# Patient Record
Sex: MALE | Race: WHITE | URBAN - METROPOLITAN AREA
[De-identification: names, ages, dates, MRNs, and addresses within clinical notes are randomized per-mention and may not be internally consistent; named-entity substitution may affect disease eponyms.]

---

## 2020-11-06 ENCOUNTER — APPOINTMENT (RX ONLY)
Dept: URBAN - METROPOLITAN AREA CLINIC 158 | Facility: CLINIC | Age: 80
Setting detail: DERMATOLOGY
End: 2020-11-06

## 2020-11-06 DIAGNOSIS — D69.2 OTHER NONTHROMBOCYTOPENIC PURPURA: ICD-10-CM

## 2020-11-06 DIAGNOSIS — Z85.828 PERSONAL HISTORY OF OTHER MALIGNANT NEOPLASM OF SKIN: ICD-10-CM

## 2020-11-06 DIAGNOSIS — L57.0 ACTINIC KERATOSIS: ICD-10-CM

## 2020-11-06 DIAGNOSIS — Z71.89 OTHER SPECIFIED COUNSELING: ICD-10-CM

## 2020-11-06 DIAGNOSIS — L72.0 EPIDERMAL CYST: ICD-10-CM

## 2020-11-06 DIAGNOSIS — L82.1 OTHER SEBORRHEIC KERATOSIS: ICD-10-CM

## 2020-11-06 DIAGNOSIS — L81.4 OTHER MELANIN HYPERPIGMENTATION: ICD-10-CM

## 2020-11-06 DIAGNOSIS — Z86.006 PERSONAL HISTORY OF MELANOMA IN-SITU: ICD-10-CM

## 2020-11-06 PROBLEM — Z85.820 PERSONAL HISTORY OF MALIGNANT MELANOMA OF SKIN: Status: ACTIVE | Noted: 2020-11-06

## 2020-11-06 PROCEDURE — ? OBSERVATION

## 2020-11-06 PROCEDURE — 99203 OFFICE O/P NEW LOW 30 MIN: CPT | Mod: 25

## 2020-11-06 PROCEDURE — 17004 DESTROY PREMAL LESIONS 15/>: CPT

## 2020-11-06 PROCEDURE — ? COUNSELING

## 2020-11-06 PROCEDURE — ? OTHER

## 2020-11-06 PROCEDURE — ? LIQUID NITROGEN

## 2020-11-06 ASSESSMENT — LOCATION SIMPLE DESCRIPTION DERM
LOCATION SIMPLE: RIGHT PRETIBIAL REGION
LOCATION SIMPLE: LEFT EAR
LOCATION SIMPLE: CHEST
LOCATION SIMPLE: NECK
LOCATION SIMPLE: RIGHT TEMPLE
LOCATION SIMPLE: UPPER BACK
LOCATION SIMPLE: LEFT UPPER ARM
LOCATION SIMPLE: POSTERIOR SCALP
LOCATION SIMPLE: LEFT CHEEK
LOCATION SIMPLE: SCALP
LOCATION SIMPLE: LEFT PRETIBIAL REGION
LOCATION SIMPLE: LEFT SHOULDER
LOCATION SIMPLE: RIGHT EYEBROW
LOCATION SIMPLE: NOSE
LOCATION SIMPLE: RIGHT SCALP
LOCATION SIMPLE: RIGHT FOREARM
LOCATION SIMPLE: RIGHT FOREHEAD
LOCATION SIMPLE: ABDOMEN
LOCATION SIMPLE: LEFT FOREARM
LOCATION SIMPLE: LEFT ANTERIOR NECK
LOCATION SIMPLE: RIGHT EAR
LOCATION SIMPLE: LOWER BACK
LOCATION SIMPLE: RIGHT SHOULDER

## 2020-11-06 ASSESSMENT — LOCATION ZONE DERM
LOCATION ZONE: LEG
LOCATION ZONE: ARM
LOCATION ZONE: FACE
LOCATION ZONE: NECK
LOCATION ZONE: NOSE
LOCATION ZONE: EAR
LOCATION ZONE: SCALP
LOCATION ZONE: TRUNK

## 2020-11-06 ASSESSMENT — LOCATION DETAILED DESCRIPTION DERM
LOCATION DETAILED: RIGHT SUPERIOR FOREHEAD
LOCATION DETAILED: SUPERIOR LUMBAR SPINE
LOCATION DETAILED: LEFT CLAVICULAR NECK
LOCATION DETAILED: STERNUM
LOCATION DETAILED: LEFT ANTERIOR SHOULDER
LOCATION DETAILED: RIGHT LATERAL EYEBROW
LOCATION DETAILED: LEFT VENTRAL PROXIMAL FOREARM
LOCATION DETAILED: SUPERIOR THORACIC SPINE
LOCATION DETAILED: LEFT CENTRAL POSTERIOR NECK
LOCATION DETAILED: LEFT SUPERIOR CENTRAL BUCCAL CHEEK
LOCATION DETAILED: LEFT POSTERIOR PARIETAL SCALP
LOCATION DETAILED: RIGHT POSTERIOR SHOULDER
LOCATION DETAILED: RIGHT DISTAL DORSAL FOREARM
LOCATION DETAILED: LEFT DISTAL PRETIBIAL REGION
LOCATION DETAILED: NASAL DORSUM
LOCATION DETAILED: RIGHT SUPERIOR HELIX
LOCATION DETAILED: LEFT POSTERIOR SHOULDER
LOCATION DETAILED: LEFT SUPERIOR PARIETAL SCALP
LOCATION DETAILED: RIGHT CENTRAL FRONTAL SCALP
LOCATION DETAILED: RIGHT MID TEMPLE
LOCATION DETAILED: LEFT SUPERIOR HELIX
LOCATION DETAILED: LEFT DISTAL POSTERIOR UPPER ARM
LOCATION DETAILED: LEFT DISTAL LATERAL POSTERIOR UPPER ARM
LOCATION DETAILED: PERIUMBILICAL SKIN
LOCATION DETAILED: LEFT DISTAL ULNAR DORSAL FOREARM
LOCATION DETAILED: LEFT DISTAL DORSAL FOREARM
LOCATION DETAILED: LEFT CENTRAL PARIETAL SCALP
LOCATION DETAILED: LEFT PROXIMAL POSTERIOR UPPER ARM
LOCATION DETAILED: RIGHT SUPERIOR PARIETAL SCALP
LOCATION DETAILED: RIGHT DISTAL PRETIBIAL REGION

## 2020-11-06 ASSESSMENT — BSA RASH: BSA RASH: 2

## 2020-11-06 ASSESSMENT — SEVERITY ASSESSMENT: SEVERITY: MILD TO MODERATE

## 2020-11-06 NOTE — PROCEDURE: MIPS QUALITY
Quality 110: Preventive Care And Screening: Influenza Immunization: Influenza Immunization Administered during Influenza season
Detail Level: Detailed
Quality 138: Melanoma: Coordination Of Care: A treatment plan was communicated to the physicians providing continuing care within one month of diagnosis outlining: diagnosis, tumor thickness and a plan for surgery or alternate care.
Quality 110: Preventive Care And Screening: Influenza Immunization: Influenza Immunization Ordered or Recommended, but not Administered due to system reason
Quality 397: Melanoma: Reporting: The pathology report includes a pT Category and statement on thickness and ulceration for pT1, mitotic rate.
Quality 111:Pneumonia Vaccination Status For Older Adults: Pneumococcal Vaccination Previously Received
Quality 137: Melanoma: Continuity Of Care - Recall System: Patient information entered into a recall system that includes: target date for the next exam specified AND a process to follow up with patients regarding missed or unscheduled appointments

## 2021-05-19 NOTE — PROCEDURE: COUNSELING
RECOMMENDATIONS:  1. Continue Keppra (100mg/ml) at 500 mg (5 ml) every morning and 500 mg (5 ml) every evening. 2. I would recommend to start Vitamin B6 25 mg daily. 3. Continuing to follow up with the with ophthalmology and audiology. 4. Seizure safety and fall precautions were discussed at today's visit. 5. Diastat at 7.5 mg rectally for seizures lasting more than three minutes. 6. I recommend her to use Melatonin at 1 mg if needed for sleep issues. 7. Follow up in 6 months or earlier if needed.
Detail Level: Detailed
When Should The Patient Follow-Up For Their Next Full-Body Skin Exam?: 1 Year
Quality 137: Melanoma: Continuity Of Care - Recall System: Patient information entered into a recall system that includes: target date for the next exam specified AND a process to follow up with patients regarding missed or unscheduled appointments
Detail Level: Simple

## 2024-10-16 ENCOUNTER — OFFICE VISIT (OUTPATIENT)
Dept: URBAN - METROPOLITAN AREA CLINIC 51 | Facility: CLINIC | Age: 84
End: 2024-10-16
Payer: MEDICARE

## 2024-10-16 DIAGNOSIS — E11.3293 TYPE 2 DIAB W MILD NONPRLF DIABETIC RTNOP W/O MACULAR EDEMA, BILATERAL: ICD-10-CM

## 2024-10-16 DIAGNOSIS — H40.023 OPEN ANGLE WITH BORDERLINE FINDINGS, HIGH RISK, BILATERAL: Primary | ICD-10-CM

## 2024-10-16 DIAGNOSIS — H43.313 VITREOUS MEMBRANES AND STRANDS, BILATERAL: ICD-10-CM

## 2024-10-16 DIAGNOSIS — H52.4 PRESBYOPIA: ICD-10-CM

## 2024-10-16 DIAGNOSIS — H25.813 COMBINED FORMS OF AGE-RELATED CATARACT, BILATERAL: ICD-10-CM

## 2024-10-16 DIAGNOSIS — H40.033 ANATOMICAL NARROW ANGLE, BILATERAL: ICD-10-CM

## 2024-10-16 DIAGNOSIS — Z79.4 LONG TERM (CURRENT) USE OF INSULIN: ICD-10-CM

## 2024-10-16 PROCEDURE — 99204 OFFICE O/P NEW MOD 45 MIN: CPT | Performed by: OPTOMETRIST

## 2024-10-16 PROCEDURE — 92134 CPTRZ OPH DX IMG PST SGM RTA: CPT | Performed by: OPTOMETRIST

## 2024-10-16 PROCEDURE — 92133 CPTRZD OPH DX IMG PST SGM ON: CPT | Performed by: OPTOMETRIST

## 2024-10-16 ASSESSMENT — VISUAL ACUITY
OS: 20/50
OD: 20/50

## 2024-10-16 ASSESSMENT — INTRAOCULAR PRESSURE
OD: 18
OS: 21

## 2024-10-16 ASSESSMENT — KERATOMETRY
OD: 44.63
OS: 43.88

## 2024-11-12 ENCOUNTER — OFFICE VISIT (OUTPATIENT)
Dept: URBAN - METROPOLITAN AREA CLINIC 51 | Facility: CLINIC | Age: 84
End: 2024-11-12
Payer: MEDICARE

## 2024-11-12 DIAGNOSIS — H52.4 PRESBYOPIA: ICD-10-CM

## 2024-11-12 DIAGNOSIS — H40.023 OPEN ANGLE WITH BORDERLINE FINDINGS, HIGH RISK, BILATERAL: Primary | ICD-10-CM

## 2024-11-12 PROCEDURE — 99213 OFFICE O/P EST LOW 20 MIN: CPT | Performed by: OPTOMETRIST

## 2024-11-12 PROCEDURE — 92083 EXTENDED VISUAL FIELD XM: CPT | Performed by: OPTOMETRIST

## 2024-11-12 ASSESSMENT — INTRAOCULAR PRESSURE
OS: 21
OD: 19

## 2024-11-14 NOTE — HPI: MELANOMA F/U (HISTORY OF MALIGNANT MELANOMA)
What Is The Reason For Today's Visit?: History of Melanoma
What Stage Is The Melanoma?: Stage 0
with patient